# Patient Record
Sex: MALE | Race: WHITE | NOT HISPANIC OR LATINO | Employment: PART TIME | URBAN - METROPOLITAN AREA
[De-identification: names, ages, dates, MRNs, and addresses within clinical notes are randomized per-mention and may not be internally consistent; named-entity substitution may affect disease eponyms.]

---

## 2023-02-27 ENCOUNTER — OFFICE VISIT (OUTPATIENT)
Dept: URGENT CARE | Age: 68
End: 2023-02-27

## 2023-02-27 VITALS
RESPIRATION RATE: 18 BRPM | TEMPERATURE: 97.2 F | HEART RATE: 94 BPM | OXYGEN SATURATION: 99 % | DIASTOLIC BLOOD PRESSURE: 74 MMHG | SYSTOLIC BLOOD PRESSURE: 122 MMHG

## 2023-02-27 DIAGNOSIS — B02.9 HERPES ZOSTER WITHOUT COMPLICATION: Primary | ICD-10-CM

## 2023-02-27 RX ORDER — PREDNISONE 20 MG/1
40 TABLET ORAL DAILY
Qty: 8 TABLET | Refills: 0 | Status: SHIPPED | OUTPATIENT
Start: 2023-02-27 | End: 2023-03-03

## 2023-02-27 NOTE — PROGRESS NOTES
[id number] [facility]        NAME: Santos Hercules is a 74 y o  male  : 1955    MRN: [id number]  DATE: 2023  TIME: 11:12 AM    Assessment and Plan   Herpes zoster without complication [N48 2]  1  Herpes zoster without complication  predniSONE 20 mg tablet        Patient presents for eval of rash to left side chest into back  Started fri-sat  Has vesicles  Previously had shingles  States its mild  Assessment notes shinges rash to left side back and chest  Follows dermatome  No draining  Discussed treatment with prednisone  Pt history indicates contraindication for antivirals, No recent labs  At 3 day window- symptoms mild  Patient Instructions       Follow up with PCP as scheduled    Chief Complaint     Chief Complaint   Patient presents with   • Rash     Patient relates rash on left side of chest and back  Noticed Friday night  Painful         History of Present Illness       Patient presents for eval of rash to left side chest into back  Started fri-sat  Has vesicles  Previously had shingles  States its mild  Assessment notes shinges rash to left side back and chest  Follows dermatome  No draining  Discussed treatment with prednisone  Pt history indicates contraindication for antivirals, No recent labs  At 3 day window- symptoms mild  Review of Systems   Review of Systems   Constitutional: Negative for chills and fever  HENT: Negative for congestion, ear pain, postnasal drip, sinus pain and sore throat  Eyes: Negative for pain and itching  Respiratory: Negative for cough, shortness of breath and wheezing  Cardiovascular: Negative for chest pain and palpitations  Gastrointestinal: Negative for abdominal pain, constipation, diarrhea, nausea and vomiting  Genitourinary: Negative for difficulty urinating and hematuria  Musculoskeletal: Negative for arthralgias and myalgias  Skin: Positive for rash     Neurological: Negative for dizziness, light-headedness and headaches  Psychiatric/Behavioral: Negative for agitation and sleep disturbance  The patient is not nervous/anxious  Current Medications       Current Outpatient Medications:   •  predniSONE 20 mg tablet, Take 2 tablets (40 mg total) by mouth daily for 4 days, Disp: 8 tablet, Rfl: 0    Current Allergies     Allergies as of 02/27/2023   • (No Known Allergies)            The following portions of the patient's history were reviewed and updated as appropriate: allergies, current medications, past family history, past medical history, past social history, past surgical history and problem list      No past medical history on file  No past surgical history on file  No family history on file  Medications have been verified  Objective   /74   Pulse 94   Temp (!) 97 2 °F (36 2 °C)   Resp 18   SpO2 99%   No LMP for male patient  Physical Exam     Physical Exam  Vitals reviewed  Constitutional:       General: He is not in acute distress  Appearance: Normal appearance  He is not ill-appearing  HENT:      Head: Normocephalic and atraumatic  Eyes:      Extraocular Movements: Extraocular movements intact  Conjunctiva/sclera: Conjunctivae normal    Pulmonary:      Effort: Pulmonary effort is normal    Skin:     General: Skin is warm  Findings: Rash present  Rash is vesicular  Neurological:      General: No focal deficit present  Mental Status: He is alert     Psychiatric:         Mood and Affect: Mood normal          Behavior: Behavior normal          Judgment: Judgment normal

## 2023-04-03 ENCOUNTER — OFFICE VISIT (OUTPATIENT)
Dept: FAMILY MEDICINE CLINIC | Facility: CLINIC | Age: 68
End: 2023-04-03

## 2023-04-03 VITALS
DIASTOLIC BLOOD PRESSURE: 80 MMHG | SYSTOLIC BLOOD PRESSURE: 118 MMHG | OXYGEN SATURATION: 99 % | TEMPERATURE: 97.1 F | HEIGHT: 70 IN | BODY MASS INDEX: 27.2 KG/M2 | HEART RATE: 77 BPM | WEIGHT: 190 LBS | RESPIRATION RATE: 16 BRPM

## 2023-04-03 DIAGNOSIS — E78.49 OTHER HYPERLIPIDEMIA: ICD-10-CM

## 2023-04-03 DIAGNOSIS — B02.9 HERPES ZOSTER WITHOUT COMPLICATION: ICD-10-CM

## 2023-04-03 DIAGNOSIS — B02.29 POSTHERPETIC NEURALGIA: ICD-10-CM

## 2023-04-03 DIAGNOSIS — N52.2 DRUG-INDUCED ERECTILE DYSFUNCTION: ICD-10-CM

## 2023-04-03 DIAGNOSIS — Z00.00 WELCOME TO MEDICARE PREVENTIVE VISIT: ICD-10-CM

## 2023-04-03 DIAGNOSIS — I10 PRIMARY HYPERTENSION: ICD-10-CM

## 2023-04-03 DIAGNOSIS — Z12.11 ENCOUNTER FOR SCREENING FOR MALIGNANT NEOPLASM OF COLON: ICD-10-CM

## 2023-04-03 DIAGNOSIS — I48.91 NEW ONSET ATRIAL FIBRILLATION (HCC): Primary | ICD-10-CM

## 2023-04-03 DIAGNOSIS — Z12.5 PROSTATE CANCER SCREENING: ICD-10-CM

## 2023-04-03 DIAGNOSIS — Z23 ENCOUNTER FOR IMMUNIZATION: ICD-10-CM

## 2023-04-03 RX ORDER — SILDENAFIL 100 MG/1
100 TABLET, FILM COATED ORAL DAILY PRN
Qty: 30 TABLET | Refills: 0 | Status: SHIPPED | OUTPATIENT
Start: 2023-04-03

## 2023-04-03 RX ORDER — LIDOCAINE 50 MG/G
1 PATCH TOPICAL DAILY
Qty: 30 PATCH | Refills: 2 | Status: SHIPPED | OUTPATIENT
Start: 2023-04-03

## 2023-04-03 RX ORDER — GABAPENTIN 100 MG/1
100 CAPSULE ORAL 3 TIMES DAILY
Qty: 90 CAPSULE | Refills: 0 | Status: SHIPPED | OUTPATIENT
Start: 2023-04-03

## 2023-04-03 NOTE — PROGRESS NOTES
Assessment and Plan:     Problem List Items Addressed This Visit        Cardiovascular and Mediastinum    New onset atrial fibrillation ([address] ) - Primary     Currently asymptomatic and stable  Continue taking metoprolol tartrate 25 mg daily  Begin taking baby Aspirin daily since Eliquis is expensive  Continue to monitor and follow up with cardiology if needed  Relevant Medications    metoprolol tartrate (LOPRESSOR) 25 mg tablet    sildenafil (VIAGRA) 100 mg tablet    Hypertension     Well-controlled on metoprolol 25 mg twice a day  We will continue to monitor  Relevant Medications    metoprolol tartrate (LOPRESSOR) 25 mg tablet       Nervous and Auditory    Postherpetic neuralgia     He was given prescriptions for Lidoderm and gabapentin  Discussed about possible side effect  Come back in 1 month for follow-up  Relevant Medications    lidocaine (Lidoderm) 5 %    gabapentin (Neurontin) 100 mg capsule       Other    Shingles     Has been symptomatic for 5 weeks  Prescribed 5 day dose of prednisone at urgent care in February - no relief  Patient was never prescribed antiviral  Symptoms remain  Patient taking tylenol PRN pain  Prescribe gabapentin 100 mg tablets and lidocaine 5% cream to reduce pain since antivirals will not work after this much time  Continue to monitor  Welcome to Medicare preventive visit     Discussed diet, exercise, immunizations and safety measures  Repeat baseline labs scheduled for next visit  Referral for colonoscopy sent  Received Prevnar shot in office today  Drug-induced erectile dysfunction     He was given a prescription for Viagra  Discussed with possible side effects  Relevant Medications    sildenafil (VIAGRA) 100 mg tablet    Other hyperlipidemia     Not well controlled his LDL was slightly elevated  Discussed about low-fat diet and regular exercise  I will repeat his fasting lipid profile  Continue to monitor           Relevant Orders    CBC and differential    Comprehensive metabolic panel    Lipid Panel with Direct LDL reflex    TSH, 3rd generation with Free T4 reflex   Other Visit Diagnoses     Prostate cancer screening        Relevant Orders    PSA, Total Screen    Encounter for immunization        Relevant Orders    Pneumococcal Conjugate Vaccine 20-valent (Pcv20) (Completed)    Encounter for screening for malignant neoplasm of colon        Relevant Orders    Ambulatory Referral to General Surgery    BMI 27 0-27 9,adult               Preventive health issues were discussed with patient, and age appropriate screening tests were ordered as noted in patient's After Visit Summary  Personalized health advice and appropriate referrals for health education or preventive services given if needed, as noted in patient's After Visit Summary  History of Present Illness:     Patient presents for a Medicare Wellness Visit    HPI   Patient Care Team:  Stefani Ty MD as PCP - General (Family Medicine)     Review of Systems:     Review of Systems   Constitutional: Negative for chills and fever  HENT: Negative for trouble swallowing  Eyes: Negative for visual disturbance  Respiratory: Negative for cough and shortness of breath  Cardiovascular: Negative for chest pain and palpitations  Gastrointestinal: Negative for abdominal pain, blood in stool and vomiting  Endocrine: Negative for cold intolerance and heat intolerance  Genitourinary: Negative for difficulty urinating and dysuria  Musculoskeletal: Negative for gait problem  Skin: Positive for rash  Neurological: Negative for dizziness, syncope and headaches  Hematological: Negative for adenopathy  Psychiatric/Behavioral: Negative for behavioral problems          Problem List:     Patient Active Problem List   Diagnosis   • New onset atrial fibrillation ([address] )   • Shingles   • Welcome to Medicare preventive visit   • Hypertension   • Postherpetic neuralgia   • Drug-induced erectile dysfunction   • Other hyperlipidemia      Past Medical and Surgical History:     Past Medical History:   Diagnosis Date   • Hypertension      History reviewed  No pertinent surgical history  Family History:     Family History   Problem Relation Age of Onset   • Colon cancer Mother    • Diabetes Father       Social History:     Social History     Socioeconomic History   • Marital status: Single     Spouse name: None   • Number of children: None   • Years of education: None   • Highest education level: None   Occupational History   • None   Tobacco Use   • Smoking status: Never   • Smokeless tobacco: Never   Vaping Use   • Vaping Use: Never used   Substance and Sexual Activity   • Alcohol use: Never   • Drug use: Never   • Sexual activity: None   Other Topics Concern   • None   Social History Narrative   • None     Social Determinants of Health     Financial Resource Strain: Not on file   Food Insecurity: Not on file   Transportation Needs: Not on file   Physical Activity: Not on file   Stress: Not on file   Social Connections: Not on file   Intimate Partner Violence: Not on file   Housing Stability: Not on file      Medications and Allergies:     Current Outpatient Medications   Medication Sig Dispense Refill   • gabapentin (Neurontin) 100 mg capsule Take 1 capsule (100 mg total) by mouth 3 (three) times a day 90 capsule 0   • lidocaine (Lidoderm) 5 % Apply 1 patch topically over 12 hours daily Remove & Discard patch within 12 hours or as directed by MD 30 patch 2   • sildenafil (VIAGRA) 100 mg tablet Take 1 tablet (100 mg total) by mouth daily as needed for erectile dysfunction 30 tablet 0   • metoprolol tartrate (LOPRESSOR) 25 mg tablet Take 25 mg by mouth 2 (two) times a day       No current facility-administered medications for this visit       No Known Allergies   Immunizations:     Immunization History   Administered Date(s) Administered   • INFLUENZA 11/06/2021, 10/11/2022   • Pneumococcal Conjugate Vaccine 20-valent (Pcv20), Polysace 04/03/2023      Health Maintenance:         Topic Date Due   • Hepatitis C Screening  Never done   • HIV Screening  Never done   • Colorectal Cancer Screening  Never done         Topic Date Due   • COVID-19 Vaccine (1) Never done      Medicare Screening Tests and Risk Assessments:     Tracy Evangelista is here for his Subsequent Wellness visit  Health Risk Assessment:   Patient rates overall health as good  Patient feels that their physical health rating is same  Patient is satisfied with their life  Eyesight was rated as same  Hearing was rated as same  Patient feels that their emotional and mental health rating is same  Patients states they are never, rarely angry  Patient states they are never, rarely unusually tired/fatigued  Pain experienced in the last 7 days has been some  Patient's pain rating has been 4/10  Patient states that he has experienced no weight loss or gain in last 6 months  Depression Screening:   PHQ-2 Score: 0      Fall Risk Screening: In the past year, patient has experienced: no history of falling in past year      Home Safety:  Patient does not have trouble with stairs inside or outside of their home  Patient has working smoke alarms and has working carbon monoxide detector  Home safety hazards include: none  Nutrition:   Current diet is Regular  Medications:   Patient is not currently taking any over-the-counter supplements  Patient is able to manage medications  Activities of Daily Living (ADLs)/Instrumental Activities of Daily Living (IADLs):   Walk and transfer into and out of bed and chair?: Yes  Dress and groom yourself?: Yes    Bathe or shower yourself?: Yes    Feed yourself?  Yes  Do your laundry/housekeeping?: Yes  Manage your money, pay your bills and track your expenses?: Yes  Make your own meals?: Yes    Do your own shopping?: Yes    Previous Hospitalizations:   Any hospitalizations or ED visits within the last "12 months?: No      Advance Care Planning:   Living will: No    Durable POA for healthcare: No    Advanced directive: No      Cognitive Screening:   Provider or family/friend/caregiver concerned regarding cognition?: No    PREVENTIVE SCREENINGS      Cardiovascular Screening:    General: Screening Current      Diabetes Screening:     General: Risks and Benefits Discussed    Due for: Blood Glucose      Colorectal Cancer Screening:     General: Risks and Benefits Discussed    Due for: Colonoscopy - Low Risk      Prostate Cancer Screening:    General: Risks and Benefits Discussed    Due for: PSA      Osteoporosis Screening:    General: Risks and Benefits Discussed      Abdominal Aortic Aneurysm (AAA) Screening:    Risk factors include: age between 73-67 yo        General: Risks and Benefits Discussed      Lung Cancer Screening:     General: Screening Not Indicated      Hepatitis C Screening:    General: Risks and Benefits Discussed    Screening, Brief Intervention, and Referral to Treatment (SBIRT)    Screening  Typical number of drinks in a day: 0  Typical number of drinks in a week: 0  Interpretation: Low risk drinking behavior  Single Item Drug Screening:  How often have you used an illegal drug (including marijuana) or a prescription medication for non-medical reasons in the past year? never    Single Item Drug Screen Score: 0  Interpretation: Negative screen for possible drug use disorder    Brief Intervention  Alcohol & drug use screenings were reviewed  No concerns regarding substance use disorder identified  Other Counseling Topics:   Calcium and vitamin D intake and regular weightbearing exercise  No results found       Physical Exam:     /80 (BP Location: Left arm, Patient Position: Sitting, Cuff Size: Adult)   Pulse 77   Temp (!) 97 1 °F (36 2 °C) (Tympanic)   Resp 16   Ht 5' 10\" (1 778 m)   Wt 86 2 kg (190 lb)   SpO2 99%   BMI 27 26 kg/m²     Physical Exam  Vitals and nursing note " reviewed  Constitutional:       General: He is not in acute distress  Appearance: He is well-developed  HENT:      Head: Normocephalic and atraumatic  Eyes:      Conjunctiva/sclera: Conjunctivae normal    Cardiovascular:      Rate and Rhythm: Normal rate and regular rhythm  Heart sounds: No murmur heard  Pulmonary:      Effort: Pulmonary effort is normal  No respiratory distress  Breath sounds: Normal breath sounds  Abdominal:      Palpations: Abdomen is soft  Tenderness: There is no abdominal tenderness  Musculoskeletal:         General: No swelling  Cervical back: Neck supple  Skin:     General: Skin is warm and dry  Capillary Refill: Capillary refill takes less than 2 seconds  Findings: Rash present  Neurological:      Mental Status: He is alert     Psychiatric:         Mood and Affect: Mood normal           Scott Mccarty MD

## 2023-04-03 NOTE — ASSESSMENT & PLAN NOTE
Has been symptomatic for 5 weeks  Prescribed 5 day dose of prednisone at urgent care in February - no relief  Patient was never prescribed antiviral  Symptoms remain  Patient taking tylenol PRN pain  Prescribe gabapentin 100 mg tablets and lidocaine 5% cream to reduce pain since antivirals will not work after this much time  Continue to monitor

## 2023-04-03 NOTE — ASSESSMENT & PLAN NOTE
He was given prescriptions for Lidoderm and gabapentin  Discussed about possible side effect  Come back in 1 month for follow-up

## 2023-04-03 NOTE — ASSESSMENT & PLAN NOTE
Currently asymptomatic and stable  Continue taking metoprolol tartrate 25 mg daily  Begin taking baby Aspirin daily since Eliquis is expensive  Continue to monitor and follow up with cardiology if needed

## 2023-04-03 NOTE — ASSESSMENT & PLAN NOTE
Not well controlled his LDL was slightly elevated  Discussed about low-fat diet and regular exercise  I will repeat his fasting lipid profile  Continue to monitor

## 2023-04-03 NOTE — ASSESSMENT & PLAN NOTE
Discussed diet, exercise, immunizations and safety measures  Repeat baseline labs scheduled for next visit  Referral for colonoscopy sent  Received Prevnar shot in office today

## 2023-04-03 NOTE — PROGRESS NOTES
Name: Han Rosas      : 1955      MRN: [id number]  Encounter Provider: Birgit Willis MD  Encounter Date: 4/3/2023   Encounter department: [address] [place] [address]  New onset atrial fibrillation [facility])  Assessment & Plan:  Currently asymptomatic and stable  Continue taking metoprolol tartrate 25 mg daily  Begin taking baby Aspirin daily since Eliquis is expensive  Continue to monitor and follow up with cardiology if needed  2  Primary hypertension  Assessment & Plan:  Well-controlled on metoprolol 25 mg twice a day  We will continue to monitor  3  Postherpetic neuralgia  Assessment & Plan:  He was given prescriptions for Lidoderm and gabapentin  Discussed about possible side effect  Come back in 1 month for follow-up  Orders:  -     lidocaine (Lidoderm) 5 %; Apply 1 patch topically over 12 hours daily Remove & Discard patch within 12 hours or as directed by MD  -     gabapentin (Neurontin) 100 mg capsule; Take 1 capsule (100 mg total) by mouth 3 (three) times a day    4  Drug-induced erectile dysfunction  Assessment & Plan:  He was given a prescription for Viagra  Discussed with possible side effects  Orders:  -     sildenafil (VIAGRA) 100 mg tablet; Take 1 tablet (100 mg total) by mouth daily as needed for erectile dysfunction    5  Other hyperlipidemia  Assessment & Plan:  Not well controlled his LDL was slightly elevated  Discussed about low-fat diet and regular exercise  I will repeat his fasting lipid profile  Continue to monitor  Orders:  -     CBC and differential; Future  -     Comprehensive metabolic panel; Future  -     Lipid Panel with Direct LDL reflex; Future  -     TSH, 3rd generation with Free T4 reflex; Future    6  Prostate cancer screening  -     PSA, Total Screen; Future    7  Herpes zoster without complication  Assessment & Plan:  Has been symptomatic for 5 weeks   Prescribed 5 day dose of prednisone at urgent care in February - no relief  Patient was never prescribed antiviral  Symptoms remain  Patient taking tylenol PRN pain  Prescribe gabapentin 100 mg tablets and lidocaine 5% cream to reduce pain since antivirals will not work after this much time  Continue to monitor  8  Encounter for immunization  -     Pneumococcal Conjugate Vaccine 20-valent (Pcv20)    9  Welcome to Medicare preventive visit  Assessment & Plan:  Discussed diet, exercise, immunizations and safety measures  Repeat baseline labs scheduled for next visit  Referral for colonoscopy sent  Received Prevnar shot in office today  10  Encounter for screening for malignant neoplasm of colon  -     Ambulatory Referral to General Surgery; Future    11  BMI 27 0-27 9,adult      BMI Counseling: Body mass index is 27 26 kg/m²  The BMI is above normal  Nutrition recommendations include decreasing portion sizes, encouraging healthy choices of fruits and vegetables and decreasing fast food intake  Rationale for BMI follow-up plan is due to patient being overweight or obese  Depression Screening and Follow-up Plan: Patient was screened for depression during today's encounter  They screened negative with a PHQ-2 score of 0  Falls Plan of Care: balance, strength, and gait training instructions were provided  Ben Gordillo is a 74year old male with PMX of HTN and acute onset atrial fibrillation, presenting to establish care  On 10/18/23 he was diagnosed with acute atrial fibrillation and placed on metoprolol tatrate as well as Eliquis  He was seen by cardiology and they performed an EKG and ECHO which showed active atrial fibrillation  He has not been taking the Eliquis but has remained on the metoprolol  He no longer follows with cardiology  He denies recent chest pain, palpitations, SOB, edema or dizziness  His BP is controlled at 118/80   He has also been suffering from Shingles that began 5 weeks ago originating on the left abdomen and going to the left back  He was seen at urgent care and given 5 days of prednisone, but no antivirals  He states he has been taking tylenol since the prednisone ran out due to increased pain  He has no other concerns at this time  Review of Systems   Constitutional: Negative for activity change, chills, fatigue and fever  HENT: Negative for congestion, sore throat and trouble swallowing  Eyes: Negative  Respiratory: Negative for apnea, cough and shortness of breath  Cardiovascular: Negative for chest pain, palpitations and leg swelling  Gastrointestinal: Negative for abdominal distention, abdominal pain, constipation, diarrhea, nausea and vomiting  Endocrine: Negative  Genitourinary: Negative  Musculoskeletal: Negative  Skin: Positive for rash (left abdomen/back (shingles))  Allergic/Immunologic: Negative  Neurological: Negative for dizziness, weakness and headaches  Hematological: Negative  Psychiatric/Behavioral: Negative for agitation, behavioral problems and confusion  Past Medical History:   Diagnosis Date   • Hypertension      History reviewed  No pertinent surgical history    Family History   Problem Relation Age of Onset   • Colon cancer Mother    • Diabetes Father      Social History     Socioeconomic History   • Marital status: Single     Spouse name: None   • Number of children: None   • Years of education: None   • Highest education level: None   Occupational History   • None   Tobacco Use   • Smoking status: Never   • Smokeless tobacco: Never   Vaping Use   • Vaping Use: Never used   Substance and Sexual Activity   • Alcohol use: Never   • Drug use: Never   • Sexual activity: None   Other Topics Concern   • None   Social History Narrative   • None     Social Determinants of Health     Financial Resource Strain: Not on file   Food Insecurity: Not on file   Transportation Needs: Not on file   Physical Activity: Not on file   Stress: Not on file   Social Connections: Not "on file   Intimate Partner Violence: Not on file   Housing Stability: Not on file     Current Outpatient Medications on File Prior to Visit   Medication Sig   • metoprolol tartrate (LOPRESSOR) 25 mg tablet Take 25 mg by mouth 2 (two) times a day     No Known Allergies  Immunization History   Administered Date(s) Administered   • INFLUENZA 11/06/2021, 10/11/2022   • Pneumococcal Conjugate Vaccine 20-valent (Pcv20), Polysace 04/03/2023       Objective     /80 (BP Location: Left arm, Patient Position: Sitting, Cuff Size: Adult)   Pulse 77   Temp (!) 97 1 °F (36 2 °C) (Tympanic)   Resp 16   Ht 5' 10\" (1 778 m)   Wt 86 2 kg (190 lb)   SpO2 99%   BMI 27 26 kg/m²     Physical Exam  Vitals and nursing note reviewed  Constitutional:       General: He is not in acute distress  Appearance: Normal appearance  HENT:      Head: Normocephalic and atraumatic  Right Ear: Tympanic membrane normal       Left Ear: Tympanic membrane normal       Nose: Nose normal       Mouth/Throat:      Mouth: Mucous membranes are moist       Pharynx: Oropharynx is clear  No oropharyngeal exudate  Eyes:      Extraocular Movements: Extraocular movements intact  Conjunctiva/sclera: Conjunctivae normal       Pupils: Pupils are equal, round, and reactive to light  Cardiovascular:      Rate and Rhythm: Normal rate and regular rhythm  Pulses: Normal pulses  Heart sounds: Normal heart sounds  No murmur heard  Pulmonary:      Effort: Pulmonary effort is normal  No respiratory distress  Breath sounds: Normal breath sounds  Abdominal:      General: Abdomen is flat  Bowel sounds are normal  There is no distension  Palpations: Abdomen is soft  Tenderness: There is no abdominal tenderness  Musculoskeletal:         General: Normal range of motion  Cervical back: Normal range of motion and neck supple  Back:       Right lower leg: No edema  Left lower leg: No edema        Comments: " Shingles rash located on left abdomen/back  Skin:     General: Skin is warm and dry  Capillary Refill: Capillary refill takes less than 2 seconds  Findings: Rash (left abdomen/back (shingles)) present  Neurological:      General: No focal deficit present  Mental Status: He is alert and oriented to person, place, and time  Psychiatric:         Mood and Affect: Mood normal          Behavior: Behavior normal        Suzanne Srinivasan MD BMI Counseling: Body mass index is 27 26 kg/m²  The BMI is above normal  Nutrition recommendations include reducing portion sizes, decreasing overall calorie intake and 3-5 servings of fruits/vegetables daily  Exercise recommendations include moderate aerobic physical activity for 150 minutes/week

## 2023-04-05 ENCOUNTER — TELEPHONE (OUTPATIENT)
Dept: FAMILY MEDICINE CLINIC | Facility: CLINIC | Age: 68
End: 2023-04-05

## 2023-04-07 NOTE — TELEPHONE ENCOUNTER
Per cover my meds, Lidocaine patch approved  L/m for pt and pharmacy     Case [id number] valid 3/6/23-4/4/24

## 2023-04-24 ENCOUNTER — OFFICE VISIT (OUTPATIENT)
Dept: SURGERY | Facility: CLINIC | Age: 68
End: 2023-04-24

## 2023-04-24 ENCOUNTER — RA CDI HCC (OUTPATIENT)
Dept: OTHER | Facility: HOSPITAL | Age: 68
End: 2023-04-24

## 2023-04-24 VITALS
HEIGHT: 70 IN | BODY MASS INDEX: 27.2 KG/M2 | HEART RATE: 82 BPM | SYSTOLIC BLOOD PRESSURE: 128 MMHG | WEIGHT: 190 LBS | TEMPERATURE: 97.6 F | DIASTOLIC BLOOD PRESSURE: 70 MMHG

## 2023-04-24 DIAGNOSIS — Z12.11 ENCOUNTER FOR SCREENING COLONOSCOPY: Primary | ICD-10-CM

## 2023-04-24 DIAGNOSIS — Z80.0 FAMILY HISTORY OF COLON CANCER IN MOTHER: ICD-10-CM

## 2023-04-24 NOTE — PROGRESS NOTES
[address]  coding opportunities       Chart reviewed, no opportunity found: CHART REVIEWED, NO OPPORTUNITY FOUND        Patients Insurance     Medicare Insurance: Medicare

## 2023-04-24 NOTE — PROGRESS NOTES
Assessment/Plan:   Zenia Villeda is a 74 y  o male who is here for a: Screening Colonoscopy  Plan: Colonoscopy for: Screening for Colon Cancer      Previous Colonoscopy and  Location of polyps if any:   10 years ago      Preoperative Clearance: None      ____________________________________________________    HPI:  Zenia Villeda is a 74 y  o male who was referred for evaluation of    Screening  Currently:     Symptoms include:  no abdominal pain, change in bowel habits, or black or bloody stools  Family history of colon cancer: mother had colon cancer at [de-identified]       Anticoagulation: ASA for afib    Recent A1C: none      LABS:      Lab Results   Component Value Date    WBC 4 57 04/11/2023    HGB 12 6 04/11/2023    HCT 39 1 04/11/2023    MCV 95 04/11/2023     04/11/2023     Lab Results   Component Value Date    K 4 5 04/11/2023     04/11/2023    CO2 25 04/11/2023    BUN 16 04/11/2023    CREATININE 1 17 04/11/2023    GLUF 98 04/11/2023    CALCIUM 8 9 04/11/2023    AST 15 04/11/2023    ALT 18 04/11/2023    ALKPHOS 45 (L) 04/11/2023    EGFR 65 04/11/2023     No results found for: HGBA1C  No results found for: INR, PROTIME      No orders to display         Review of Systems   Constitutional: Negative for chills, diaphoresis, fever and unexpected weight change  Respiratory: Negative for chest tightness and shortness of breath  Cardiovascular: Negative for chest pain, palpitations and leg swelling  Gastrointestinal: Negative for abdominal pain, anal bleeding, blood in stool, constipation, diarrhea, nausea, rectal pain and vomiting  Genitourinary: Negative for difficulty urinating and frequency  Skin: Negative for color change, rash and wound  Neurological: Negative for weakness and numbness  Hematological: Does not bruise/bleed easily  Psychiatric/Behavioral: Negative for confusion  The patient is not nervous/anxious  Imaging: No new pertinent imaging studies           Patient Active Problem List   Diagnosis   • New onset atrial fibrillation (HCC)   • Shingles   • Welcome to Medicare preventive visit   • Hypertension   • Postherpetic neuralgia   • Drug-induced erectile dysfunction   • Other hyperlipidemia         Allergies:  Patient has no known allergies  Current Outpatient Medications:   •  gabapentin (Neurontin) 100 mg capsule, Take 1 capsule (100 mg total) by mouth 3 (three) times a day, Disp: 90 capsule, Rfl: 0  •  lidocaine (Lidoderm) 5 %, Apply 1 patch topically over 12 hours daily Remove & Discard patch within 12 hours or as directed by MD, Disp: 30 patch, Rfl: 2  •  metoprolol tartrate (LOPRESSOR) 25 mg tablet, Take 25 mg by mouth 2 (two) times a day, Disp: , Rfl:     Past Medical History:   Diagnosis Date   • Hypertension        History reviewed  No pertinent surgical history  Family History   Problem Relation Age of Onset   • Colon cancer Mother    • Diabetes Father        Social History     Socioeconomic History   • Marital status: Single     Spouse name: None   • Number of children: None   • Years of education: None   • Highest education level: None   Occupational History   • None   Tobacco Use   • Smoking status: Never   • Smokeless tobacco: Never   Vaping Use   • Vaping Use: Never used   Substance and Sexual Activity   • Alcohol use: Never   • Drug use: Never   • Sexual activity: None   Other Topics Concern   • None   Social History Narrative   • None     Social Determinants of Health     Financial Resource Strain: Not on file   Food Insecurity: Not on file   Transportation Needs: Not on file   Physical Activity: Not on file   Stress: Not on file   Social Connections: Not on file   Intimate Partner Violence: Not on file   Housing Stability: Not on file       Exam:   Vitals:    04/24/23 0920   BP: 128/70   Pulse: 82   Temp: 97 6 °F (36 4 °C)           ______________________________________________________    Physical Exam  Vitals and nursing note reviewed   Exam conducted with a chaperone present  Constitutional:       General: He is not in acute distress  Appearance: Normal appearance  He is normal weight  He is not ill-appearing, toxic-appearing or diaphoretic  HENT:      Head: Normocephalic and atraumatic  Cardiovascular:      Rate and Rhythm: Normal rate and regular rhythm  Pulses: Normal pulses  Heart sounds: Normal heart sounds  No murmur heard  No gallop  Pulmonary:      Effort: Pulmonary effort is normal       Breath sounds: Normal breath sounds  Abdominal:      General: Abdomen is flat  There is no distension  Palpations: Abdomen is soft  There is no mass  Tenderness: There is no abdominal tenderness  There is no guarding or rebound  Hernia: No hernia is present  Comments: Patient does have healing scarred shingles over his left lower chest along to his back  Musculoskeletal:         General: No swelling or tenderness  Lymphadenopathy:      Cervical: No cervical adenopathy  Skin:     General: Skin is warm and dry  Neurological:      General: No focal deficit present  Mental Status: He is alert and oriented to person, place, and time  Mental status is at baseline  Psychiatric:         Mood and Affect: Mood normal          Behavior: Behavior normal          Thought Content: Thought content normal          Judgment: Judgment normal            Informed consent for procedure was personally discussed, reviewed, and signed by Dr Duc Bell  Discussion by Dr Duc Bell was carried out regarding risks, benefits, and alternatives with the patient  Risks include but are not limited to:  bleeding, infection, and delayed wound healing or an open wound, pulmonary embolus, leaks from bowel or bile ducts or other viscus, transfusions, death  Discussed in further detail the more common complications and their rates of occurrence   was used if necessary    Patient expressed understanding of the issues discussed and wished/consented to proceed  All questions were answered by Dr Yvette Cueva  Discussion performed between patient and the provider signing below       Signature:   Chaparro Engel  Date: 4/24/2023 Time: 9:23 AM

## 2023-05-01 ENCOUNTER — OFFICE VISIT (OUTPATIENT)
Dept: FAMILY MEDICINE CLINIC | Facility: CLINIC | Age: 68
End: 2023-05-01

## 2023-05-01 VITALS
BODY MASS INDEX: 27.77 KG/M2 | SYSTOLIC BLOOD PRESSURE: 116 MMHG | OXYGEN SATURATION: 99 % | HEIGHT: 70 IN | TEMPERATURE: 97.8 F | HEART RATE: 110 BPM | WEIGHT: 194 LBS | DIASTOLIC BLOOD PRESSURE: 80 MMHG

## 2023-05-01 DIAGNOSIS — F41.9 ANXIETY: ICD-10-CM

## 2023-05-01 DIAGNOSIS — B02.29 POSTHERPETIC NEURALGIA: Primary | ICD-10-CM

## 2023-05-01 DIAGNOSIS — I48.20 CHRONIC A-FIB (HCC): ICD-10-CM

## 2023-05-01 DIAGNOSIS — E78.49 OTHER HYPERLIPIDEMIA: ICD-10-CM

## 2023-05-01 DIAGNOSIS — I10 PRIMARY HYPERTENSION: ICD-10-CM

## 2023-05-01 RX ORDER — GABAPENTIN 100 MG/1
100 CAPSULE ORAL 3 TIMES DAILY
Qty: 90 CAPSULE | Refills: 5 | Status: SHIPPED | OUTPATIENT
Start: 2023-05-01

## 2023-05-01 NOTE — ASSESSMENT & PLAN NOTE
Well-controlled  Continue metoprolol  Continue to monitor  cont levothyroxine-increase to 125mcg on 10/31 on empty stomach, unclear if was taking appropriately

## 2023-05-01 NOTE — PROGRESS NOTES
Name: Uli Hogue      : 1955      MRN: [id number]  Encounter Provider: Andrey Kumar MD  Encounter Date: 2023   Encounter department: [address] [place] [address]  Postherpetic neuralgia  Assessment & Plan:  Improving but not resolved  Continue gabapentin  Was given refill  Orders:  -     gabapentin (Neurontin) 100 mg capsule; Take 1 capsule (100 mg total) by mouth 3 (three) times a day    2  Primary hypertension  Assessment & Plan:  Well-controlled  Continue metoprolol  Continue to monitor  Orders:  -     CBC and differential; Future  -     Comprehensive metabolic panel; Future  -     Lipid Panel with Direct LDL reflex; Future  -     TSH, 3rd generation with Free T4 reflex; Future    3  Chronic a-fib ([address] )  Assessment & Plan:  Stable  Asymptomatic  Rate controlled  Continue to monitor  Continue on metoprolol  4  Other hyperlipidemia  Assessment & Plan:  Well-controlled  Continue to follow low-fat diet and regular exercise  5  Anxiety  Assessment & Plan: It was discussed with patient to take Neurontin to help with his anxiety  Was discussed with patient that metoprolol and propranolol or beta-blocker and he is not to take both of them at the same time  Subjective     Is here today for follow-up multiple medical problems  He has been taking medication  Denies any side effect  His blood pressures been well controlled  He has been taking his metoprolol  His postherpetic neuralgia is better controlled on Neurontin  He has history of anxiety and he is to take Inderal to help with his anxiety symptoms  Review of Systems   Constitutional: Negative for chills and fever  HENT: Negative for trouble swallowing  Eyes: Negative for visual disturbance  Respiratory: Negative for cough and shortness of breath  Cardiovascular: Negative for chest pain and palpitations     Gastrointestinal: Negative for abdominal pain, blood in stool and vomiting  Endocrine: Negative for cold intolerance and heat intolerance  Genitourinary: Negative for difficulty urinating and dysuria  Musculoskeletal: Negative for gait problem  Skin: Negative for rash  Neurological: Negative for dizziness, syncope and headaches  Hematological: Negative for adenopathy  Psychiatric/Behavioral: Negative for behavioral problems  The patient is nervous/anxious  Past Medical History:   Diagnosis Date    Hypertension      History reviewed  No pertinent surgical history    Family History   Problem Relation Age of Onset    Colon cancer Mother     Diabetes Father      Social History     Socioeconomic History    Marital status: Single     Spouse name: None    Number of children: None    Years of education: None    Highest education level: None   Occupational History    None   Tobacco Use    Smoking status: Never    Smokeless tobacco: Never   Vaping Use    Vaping Use: Never used   Substance and Sexual Activity    Alcohol use: Never    Drug use: Never    Sexual activity: None   Other Topics Concern    None   Social History Narrative    None     Social Determinants of Health     Financial Resource Strain: Not on file   Food Insecurity: Not on file   Transportation Needs: Not on file   Physical Activity: Not on file   Stress: Not on file   Social Connections: Not on file   Intimate Partner Violence: Not on file   Housing Stability: Not on file     Current Outpatient Medications on File Prior to Visit   Medication Sig    lidocaine (Lidoderm) 5 % Apply 1 patch topically over 12 hours daily Remove & Discard patch within 12 hours or as directed by MD    metoprolol tartrate (LOPRESSOR) 25 mg tablet Take 25 mg by mouth 2 (two) times a day    [DISCONTINUED] gabapentin (Neurontin) 100 mg capsule Take 1 capsule (100 mg total) by mouth 3 (three) times a day     No Known Allergies  Immunization History   Administered Date(s) Administered    INFLUENZA "11/06/2021, 10/11/2022    Pneumococcal Conjugate Vaccine 20-valent (Pcv20), Polysace 04/03/2023       Objective     /80 (BP Location: Left arm, Patient Position: Sitting, Cuff Size: Adult)   Pulse (!) 110   Temp 97 8 °F (36 6 °C) (Tympanic)   Ht 5' 10\" (1 778 m)   Wt 88 kg (194 lb)   SpO2 99%   BMI 27 84 kg/m²     Physical Exam  Vitals and nursing note reviewed  Constitutional:       Appearance: He is well-developed  HENT:      Head: Normocephalic and atraumatic  Eyes:      Pupils: Pupils are equal, round, and reactive to light  Cardiovascular:      Rate and Rhythm: Normal rate and regular rhythm  Heart sounds: Normal heart sounds  Pulmonary:      Effort: Pulmonary effort is normal       Breath sounds: Normal breath sounds  Abdominal:      General: Bowel sounds are normal       Palpations: Abdomen is soft  Musculoskeletal:         General: Normal range of motion  Cervical back: Normal range of motion and neck supple  Lymphadenopathy:      Cervical: No cervical adenopathy  Skin:     General: Skin is warm  Findings: No rash  Neurological:      Mental Status: He is alert and oriented to person, place, and time  Cranial Nerves: No cranial nerve deficit         Samanta Mejía MD  "

## 2023-05-01 NOTE — ASSESSMENT & PLAN NOTE
It was discussed with patient to take Neurontin to help with his anxiety  Was discussed with patient that metoprolol and propranolol or beta-blocker and he is not to take both of them at the same time

## 2023-06-02 PROBLEM — Z00.00 WELCOME TO MEDICARE PREVENTIVE VISIT: Status: RESOLVED | Noted: 2023-04-03 | Resolved: 2023-06-02

## 2023-06-29 ENCOUNTER — OFFICE VISIT (OUTPATIENT)
Dept: SURGERY | Facility: CLINIC | Age: 68
End: 2023-06-29

## 2023-06-29 VITALS
TEMPERATURE: 97.3 F | DIASTOLIC BLOOD PRESSURE: 78 MMHG | BODY MASS INDEX: 27.77 KG/M2 | HEIGHT: 70 IN | SYSTOLIC BLOOD PRESSURE: 110 MMHG | WEIGHT: 194 LBS | HEART RATE: 86 BPM

## 2023-06-29 DIAGNOSIS — Z12.11 ENCOUNTER FOR SCREENING COLONOSCOPY: Primary | ICD-10-CM

## 2023-06-29 NOTE — H&P (VIEW-ONLY)
Assessment/Plan:   Daniel Mckinney is a 79 y.o.male who is here for a: Screening Colonoscopy. No changes in medical history or medications. Plan: Colonoscopy for: Screening for Colon Cancer      Previous Colonoscopy and  Location of polyps if any:   10 years ago      Preoperative Clearance: None      ____________________________________________________    HPI:  Daniel Mckinney is a 79 y.o.male who was referred for evaluation of    Screening. Currently:     Symptoms include:  no abdominal pain, change in bowel habits, or black or bloody stools. Family history of colon cancer: mother had colon cancer at 80       Anticoagulation: ASA for afib    Recent A1C: none      LABS:      Lab Results   Component Value Date    WBC 4.57 04/11/2023    HGB 12.6 04/11/2023    HCT 39.1 04/11/2023    MCV 95 04/11/2023     04/11/2023     Lab Results   Component Value Date    K 4.5 04/11/2023     04/11/2023    CO2 25 04/11/2023    BUN 16 04/11/2023    CREATININE 1.17 04/11/2023    GLUF 98 04/11/2023    CALCIUM 8.9 04/11/2023    AST 15 04/11/2023    ALT 18 04/11/2023    ALKPHOS 45 (L) 04/11/2023    EGFR 65 04/11/2023     No results found for: "HGBA1C"  No results found for: "INR", "PROTIME"      No orders to display         Review of Systems   Constitutional: Negative for chills, diaphoresis, fever and unexpected weight change. Respiratory: Negative for chest tightness and shortness of breath. Cardiovascular: Negative for chest pain, palpitations and leg swelling. Gastrointestinal: Negative for abdominal pain, anal bleeding, blood in stool, constipation, diarrhea, nausea, rectal pain and vomiting. Genitourinary: Negative for difficulty urinating and frequency. Skin: Negative for color change, rash and wound. Neurological: Negative for weakness and numbness. Hematological: Does not bruise/bleed easily. Psychiatric/Behavioral: Negative for confusion. The patient is not nervous/anxious. Imaging: No new pertinent imaging studies. Patient Active Problem List   Diagnosis   • Chronic a-fib (HCC)   • Shingles   • Hypertension   • Postherpetic neuralgia   • Drug-induced erectile dysfunction   • Other hyperlipidemia   • Anxiety         Allergies:  Patient has no known allergies. Current Outpatient Medications:   •  gabapentin (Neurontin) 100 mg capsule, Take 1 capsule (100 mg total) by mouth 3 (three) times a day, Disp: 90 capsule, Rfl: 5  •  metoprolol tartrate (LOPRESSOR) 25 mg tablet, Take 25 mg by mouth 2 (two) times a day, Disp: , Rfl:   •  lidocaine (Lidoderm) 5 %, Apply 1 patch topically over 12 hours daily Remove & Discard patch within 12 hours or as directed by MD (Patient not taking: Reported on 6/29/2023), Disp: 30 patch, Rfl: 2    Past Medical History:   Diagnosis Date   • Hypertension        No past surgical history on file.     Family History   Problem Relation Age of Onset   • Colon cancer Mother    • Diabetes Father        Social History     Socioeconomic History   • Marital status: Single     Spouse name: None   • Number of children: None   • Years of education: None   • Highest education level: None   Occupational History   • None   Tobacco Use   • Smoking status: Never   • Smokeless tobacco: Never   Vaping Use   • Vaping Use: Never used   Substance and Sexual Activity   • Alcohol use: Not Currently   • Drug use: Never   • Sexual activity: None   Other Topics Concern   • None   Social History Narrative   • None     Social Determinants of Health     Financial Resource Strain: Not on file   Food Insecurity: Not on file   Transportation Needs: Not on file   Physical Activity: Not on file   Stress: Not on file   Social Connections: Not on file   Intimate Partner Violence: Not on file   Housing Stability: Not on file       Exam:   There were no vitals filed for this visit.        ______________________________________________________    Physical Exam  Vitals and nursing note reviewed. Exam conducted with a chaperone present. Constitutional:       General: He is not in acute distress. Appearance: Normal appearance. He is normal weight. He is not ill-appearing, toxic-appearing or diaphoretic. HENT:      Head: Normocephalic and atraumatic. Cardiovascular:      Rate and Rhythm: Normal rate and regular rhythm. Pulses: Normal pulses. Heart sounds: Normal heart sounds. No murmur heard. No gallop. Pulmonary:      Effort: Pulmonary effort is normal.      Breath sounds: Normal breath sounds. Abdominal:      General: Abdomen is flat. There is no distension. Palpations: Abdomen is soft. There is no mass. Tenderness: There is no abdominal tenderness. There is no guarding or rebound. Hernia: No hernia is present. Comments: Patient does have healing scarred shingles over his left lower chest along to his back. Musculoskeletal:         General: No swelling or tenderness. Lymphadenopathy:      Cervical: No cervical adenopathy. Skin:     General: Skin is warm and dry. Neurological:      General: No focal deficit present. Mental Status: He is alert and oriented to person, place, and time. Mental status is at baseline. Psychiatric:         Mood and Affect: Mood normal.         Behavior: Behavior normal.         Thought Content: Thought content normal.         Judgment: Judgment normal.           Informed consent for procedure was personally discussed, reviewed, and signed by Dr. Navi Sam. Discussion by Dr. Navi Sam was carried out regarding risks, benefits, and alternatives with the patient. Risks include but are not limited to:  bleeding, infection, and delayed wound healing or an open wound, pulmonary embolus, leaks from bowel or bile ducts or other viscus, transfusions, death. Discussed in further detail the more common complications and their rates of occurrence.  was used if necessary.   Patient expressed understanding of the issues discussed and wished/consented to proceed. All questions were answered by Dr. Giselle Nova. Discussion performed between patient and the provider signing below.      Signature:   Luisana Storm  Date: 6/29/2023 Time: 11:44 AM

## 2023-06-29 NOTE — PROGRESS NOTES
"Assessment/Plan:   Flory Bailey is a 79 y o male who is here for a: Screening Colonoscopy  No changes in medical history or medications  Plan: Colonoscopy for: Screening for Colon Cancer      Previous Colonoscopy and  Location of polyps if any:   10 years ago      Preoperative Clearance: None      ____________________________________________________    HPI:  Flory Bailey is a 79 y o male who was referred for evaluation of    Screening  Currently:     Symptoms include:  no abdominal pain, change in bowel habits, or black or bloody stools  Family history of colon cancer: mother had colon cancer at [de-identified]       Anticoagulation: ASA for afib    Recent A1C: none      LABS:      Lab Results   Component Value Date    WBC 4 57 04/11/2023    HGB 12 6 04/11/2023    HCT 39 1 04/11/2023    MCV 95 04/11/2023     04/11/2023     Lab Results   Component Value Date    K 4 5 04/11/2023     04/11/2023    CO2 25 04/11/2023    BUN 16 04/11/2023    CREATININE 1 17 04/11/2023    GLUF 98 04/11/2023    CALCIUM 8 9 04/11/2023    AST 15 04/11/2023    ALT 18 04/11/2023    ALKPHOS 45 (L) 04/11/2023    EGFR 65 04/11/2023     No results found for: \"HGBA1C\"  No results found for: \"INR\", \"PROTIME\"      No orders to display         Review of Systems   Constitutional: Negative for chills, diaphoresis, fever and unexpected weight change  Respiratory: Negative for chest tightness and shortness of breath  Cardiovascular: Negative for chest pain, palpitations and leg swelling  Gastrointestinal: Negative for abdominal pain, anal bleeding, blood in stool, constipation, diarrhea, nausea, rectal pain and vomiting  Genitourinary: Negative for difficulty urinating and frequency  Skin: Negative for color change, rash and wound  Neurological: Negative for weakness and numbness  Hematological: Does not bruise/bleed easily  Psychiatric/Behavioral: Negative for confusion  The patient is not nervous/anxious    " Imaging: No new pertinent imaging studies  Patient Active Problem List   Diagnosis   • Chronic a-fib (HCC)   • Shingles   • Hypertension   • Postherpetic neuralgia   • Drug-induced erectile dysfunction   • Other hyperlipidemia   • Anxiety         Allergies:  Patient has no known allergies  Current Outpatient Medications:   •  gabapentin (Neurontin) 100 mg capsule, Take 1 capsule (100 mg total) by mouth 3 (three) times a day, Disp: 90 capsule, Rfl: 5  •  metoprolol tartrate (LOPRESSOR) 25 mg tablet, Take 25 mg by mouth 2 (two) times a day, Disp: , Rfl:   •  lidocaine (Lidoderm) 5 %, Apply 1 patch topically over 12 hours daily Remove & Discard patch within 12 hours or as directed by MD (Patient not taking: Reported on 6/29/2023), Disp: 30 patch, Rfl: 2    Past Medical History:   Diagnosis Date   • Hypertension        No past surgical history on file      Family History   Problem Relation Age of Onset   • Colon cancer Mother    • Diabetes Father        Social History     Socioeconomic History   • Marital status: Single     Spouse name: None   • Number of children: None   • Years of education: None   • Highest education level: None   Occupational History   • None   Tobacco Use   • Smoking status: Never   • Smokeless tobacco: Never   Vaping Use   • Vaping Use: Never used   Substance and Sexual Activity   • Alcohol use: Not Currently   • Drug use: Never   • Sexual activity: None   Other Topics Concern   • None   Social History Narrative   • None     Social Determinants of Health     Financial Resource Strain: Not on file   Food Insecurity: Not on file   Transportation Needs: Not on file   Physical Activity: Not on file   Stress: Not on file   Social Connections: Not on file   Intimate Partner Violence: Not on file   Housing Stability: Not on file       Exam:   There were no vitals filed for this visit         ______________________________________________________    Physical Exam  Vitals and nursing note reviewed  Exam conducted with a chaperone present  Constitutional:       General: He is not in acute distress  Appearance: Normal appearance  He is normal weight  He is not ill-appearing, toxic-appearing or diaphoretic  HENT:      Head: Normocephalic and atraumatic  Cardiovascular:      Rate and Rhythm: Normal rate and regular rhythm  Pulses: Normal pulses  Heart sounds: Normal heart sounds  No murmur heard  No gallop  Pulmonary:      Effort: Pulmonary effort is normal       Breath sounds: Normal breath sounds  Abdominal:      General: Abdomen is flat  There is no distension  Palpations: Abdomen is soft  There is no mass  Tenderness: There is no abdominal tenderness  There is no guarding or rebound  Hernia: No hernia is present  Comments: Patient does have healing scarred shingles over his left lower chest along to his back  Musculoskeletal:         General: No swelling or tenderness  Lymphadenopathy:      Cervical: No cervical adenopathy  Skin:     General: Skin is warm and dry  Neurological:      General: No focal deficit present  Mental Status: He is alert and oriented to person, place, and time  Mental status is at baseline  Psychiatric:         Mood and Affect: Mood normal          Behavior: Behavior normal          Thought Content: Thought content normal          Judgment: Judgment normal            Informed consent for procedure was personally discussed, reviewed, and signed by Dr Charles Ann  Discussion by Dr Charles Ann was carried out regarding risks, benefits, and alternatives with the patient  Risks include but are not limited to:  bleeding, infection, and delayed wound healing or an open wound, pulmonary embolus, leaks from bowel or bile ducts or other viscus, transfusions, death  Discussed in further detail the more common complications and their rates of occurrence   was used if necessary    Patient expressed understanding of the issues discussed and wished/consented to proceed  All questions were answered by Dr Sarita Lara  Discussion performed between patient and the provider signing below       Signature:   Chaparro Freitas  Date: 2023 Time: 11:44 AM

## 2023-07-10 RX ORDER — SODIUM CHLORIDE 9 MG/ML
125 INJECTION, SOLUTION INTRAVENOUS CONTINUOUS
Status: CANCELLED | OUTPATIENT
Start: 2023-07-10

## 2023-07-11 ENCOUNTER — HOSPITAL ENCOUNTER (OUTPATIENT)
Dept: GASTROENTEROLOGY | Facility: HOSPITAL | Age: 68
Setting detail: OUTPATIENT SURGERY
Discharge: HOME | End: 2023-07-11
Attending: SURGERY
Payer: MEDICARE

## 2023-07-11 ENCOUNTER — ANESTHESIA (OUTPATIENT)
Dept: GASTROENTEROLOGY | Facility: HOSPITAL | Age: 68
End: 2023-07-11

## 2023-07-11 ENCOUNTER — ANESTHESIA EVENT (OUTPATIENT)
Dept: GASTROENTEROLOGY | Facility: HOSPITAL | Age: 68
End: 2023-07-11

## 2023-07-11 VITALS
TEMPERATURE: 97.7 F | HEART RATE: 88 BPM | DIASTOLIC BLOOD PRESSURE: 63 MMHG | RESPIRATION RATE: 21 BRPM | SYSTOLIC BLOOD PRESSURE: 97 MMHG | OXYGEN SATURATION: 100 %

## 2023-07-11 DIAGNOSIS — Z12.11 ENCOUNTER FOR SCREENING COLONOSCOPY: ICD-10-CM

## 2023-07-11 PROCEDURE — 88305 TISSUE EXAM BY PATHOLOGIST: CPT | Performed by: STUDENT IN AN ORGANIZED HEALTH CARE EDUCATION/TRAINING PROGRAM

## 2023-07-11 PROCEDURE — 45380 COLONOSCOPY AND BIOPSY: CPT | Performed by: SURGERY

## 2023-07-11 RX ORDER — PROPOFOL 10 MG/ML
INJECTION, EMULSION INTRAVENOUS AS NEEDED
Status: DISCONTINUED | OUTPATIENT
Start: 2023-07-11 | End: 2023-07-11

## 2023-07-11 RX ORDER — SODIUM CHLORIDE 9 MG/ML
125 INJECTION, SOLUTION INTRAVENOUS CONTINUOUS
Status: DISCONTINUED | OUTPATIENT
Start: 2023-07-11 | End: 2023-07-15 | Stop reason: HOSPADM

## 2023-07-11 RX ADMIN — SODIUM CHLORIDE 125 ML/HR: 0.9 INJECTION, SOLUTION INTRAVENOUS at 07:18

## 2023-07-11 RX ADMIN — PROPOFOL 50 MG: 10 INJECTION, EMULSION INTRAVENOUS at 07:40

## 2023-07-11 RX ADMIN — PROPOFOL 150 MG: 10 INJECTION, EMULSION INTRAVENOUS at 07:37

## 2023-07-11 RX ADMIN — PROPOFOL 30 MG: 10 INJECTION, EMULSION INTRAVENOUS at 07:47

## 2023-07-11 RX ADMIN — PROPOFOL 30 MG: 10 INJECTION, EMULSION INTRAVENOUS at 07:45

## 2023-07-11 RX ADMIN — PROPOFOL 30 MG: 10 INJECTION, EMULSION INTRAVENOUS at 07:50

## 2023-07-11 RX ADMIN — PROPOFOL 50 MG: 10 INJECTION, EMULSION INTRAVENOUS at 07:42

## 2023-07-11 NOTE — ANESTHESIA POSTPROCEDURE EVALUATION
Post-Op Assessment Note    CV Status:  Stable  Pain Score: 0    Pain management: adequate     Mental Status:  Alert and awake   Hydration Status:  Euvolemic   PONV Controlled:  Controlled   Airway Patency:  Patent      Post Op Vitals Reviewed: Yes      Staff: Anesthesiologist         No notable events documented.     BP      Temp      Pulse     Resp      SpO2      BP 97/63   Pulse 88   Temp 97.7 °F (36.5 °C) (Temporal)   Resp 21   SpO2 100%

## 2023-07-11 NOTE — ANESTHESIA PREPROCEDURE EVALUATION
Procedure:  COLONOSCOPY    Relevant Problems   ANESTHESIA (within normal limits)      CARDIO  Left Ventricle   Left ventricle is normal in size. Wall thickness is normal. Systolic function is normal with an ejection fraction of 60-65%. Wall motion is within normal limits. Left atrial pressure is normal     (+) Chronic a-fib (HCC)   (+) Hypertension   (+) Other hyperlipidemia      GI/HEPATIC (within normal limits)      HEMATOLOGY (within normal limits)      NEURO/PSYCH   (+) Anxiety      PULMONARY (within normal limits)        Physical Exam    Airway    Mallampati score: II         Dental   No notable dental hx     Cardiovascular  Rhythm: regular, Rate: normal, Cardiovascular exam normal    Pulmonary  Pulmonary exam normal Breath sounds clear to auscultation,     Other Findings        Anesthesia Plan  ASA Score- 3     Anesthesia Type- general with ASA Monitors. Additional Monitors:   Airway Plan:           Plan Factors-    Chart reviewed. Existing labs reviewed. Patient summary reviewed. Induction- intravenous. Postoperative Plan-     Informed Consent- Anesthetic plan and risks discussed with patient.

## 2023-07-11 NOTE — DISCHARGE INSTR - AVS FIRST PAGE
[facility]  Endoscopy Post-Operative Instructions  Dr. Kang Montero MD, FACS    Procedure: Colonoscopy    Findings:  Diverticulosis and Colon Polyp(s)    Follow-Up: You will need a repeat Endoscopy in (generally)5 years. Will await final pathology report for final determination of number of years until your follow up endoscopy, if you had polyps on this exam.  Different types of polyps require different lengths of follow up surveillance. Please call our office or your primary doctor's office if you have any questions, once the report is returned. You should have an endoscopy sooner than recommended if you have any symptoms of bleeding or change in stools or other concerns. You will receive a call from our office with your results, in addition to the the preliminary results you received today. You will usually receive a follow-up letter from our office in 1-2 weeks. Call the office if you do not hear from us. You are welcome to also schedule an office visit if desired to discuss the results further. It is your responsibility to contact our office for results in 1- 2 weeks if you do not hear from us. If a follow up endoscopy is needed, you are responsible for arranging that follow up appointment at the appropriate time. The office may or may not issue a reminder at that future time. Please take responsibility for your own follow up healthcare. Diet: Eat a light snack first, and then resume your previous diet. Call the office if you have unusual fevers, chills, nausea or vomiting or abdominal pain. Report to the emergency room with these are severe in nature. Activity: Do not drive a car, operate machinery, or sign legal documents for 24 hours after your procedure. Normal activity may be resumed on the day following the procedure.      Call the office at [phone] for any of the following: Severe abdominal pain, significant rectal bleeding, chills, or fever above 100°, new onset of persistent cough or persistent vomiting.      [address], [address]  [facility], [address] [place] [address]  Phone: [phone]

## 2023-07-11 NOTE — INTERVAL H&P NOTE
H&P reviewed. After examining the patient I find no changes in the patients condition since the H&P had been written.     Vitals:    07/11/23 0711   BP: 112/89   Pulse: 88   Resp: 15   Temp: 97.7 °F (36.5 °C)   SpO2: 100%

## 2023-07-13 ENCOUNTER — TELEPHONE (OUTPATIENT)
Dept: SURGERY | Facility: CLINIC | Age: 68
End: 2023-07-13

## 2023-07-13 NOTE — TELEPHONE ENCOUNTER
Colonoscopy follow up     Left message for patient to call the office if he has an questions or concerns.

## 2023-07-18 PROCEDURE — 88305 TISSUE EXAM BY PATHOLOGIST: CPT | Performed by: STUDENT IN AN ORGANIZED HEALTH CARE EDUCATION/TRAINING PROGRAM

## 2023-07-18 NOTE — RESULT ENCOUNTER NOTE
Please call patient with these results. Only hyperplastic or non polyps found. These are generally benign with minimal to no pre malignant potential.  Generally 7-10 year follow-up is recommended. This may be modified by family history or symptoms before that time frame.

## 2023-10-30 DIAGNOSIS — B02.29 POSTHERPETIC NEURALGIA: ICD-10-CM

## 2023-10-30 RX ORDER — GABAPENTIN 100 MG/1
100 CAPSULE ORAL 3 TIMES DAILY
Qty: 90 CAPSULE | Refills: 5 | Status: SHIPPED | OUTPATIENT
Start: 2023-10-30

## 2023-11-06 ENCOUNTER — OFFICE VISIT (OUTPATIENT)
Dept: FAMILY MEDICINE CLINIC | Facility: CLINIC | Age: 68
End: 2023-11-06
Payer: MEDICARE

## 2023-11-06 VITALS
DIASTOLIC BLOOD PRESSURE: 78 MMHG | RESPIRATION RATE: 16 BRPM | BODY MASS INDEX: 27.54 KG/M2 | WEIGHT: 192.4 LBS | HEIGHT: 70 IN | HEART RATE: 86 BPM | TEMPERATURE: 98.6 F | OXYGEN SATURATION: 99 % | SYSTOLIC BLOOD PRESSURE: 118 MMHG

## 2023-11-06 DIAGNOSIS — E78.49 OTHER HYPERLIPIDEMIA: Primary | ICD-10-CM

## 2023-11-06 DIAGNOSIS — I48.20 CHRONIC A-FIB (HCC): ICD-10-CM

## 2023-11-06 DIAGNOSIS — B02.29 POSTHERPETIC NEURALGIA: ICD-10-CM

## 2023-11-06 DIAGNOSIS — I10 PRIMARY HYPERTENSION: ICD-10-CM

## 2023-11-06 DIAGNOSIS — F41.9 ANXIETY: ICD-10-CM

## 2023-11-06 PROBLEM — N52.2 DRUG-INDUCED ERECTILE DYSFUNCTION: Status: RESOLVED | Noted: 2023-04-03 | Resolved: 2023-11-06

## 2023-11-06 PROCEDURE — 99214 OFFICE O/P EST MOD 30 MIN: CPT | Performed by: FAMILY MEDICINE

## 2023-11-06 RX ORDER — GABAPENTIN 300 MG/1
300 CAPSULE ORAL
Qty: 90 CAPSULE | Refills: 1 | Status: SHIPPED | OUTPATIENT
Start: 2023-11-06

## 2023-11-06 RX ORDER — METOPROLOL SUCCINATE 25 MG/1
25 TABLET, EXTENDED RELEASE ORAL DAILY
Qty: 90 TABLET | Refills: 1 | Status: SHIPPED | OUTPATIENT
Start: 2023-11-06

## 2023-11-07 NOTE — ASSESSMENT & PLAN NOTE
Well-controlled on metoprolol. He said he would rather take once daily I am going to switch him on Toprol-XL 25 mg 1 daily. Discussed with possible side effects.

## 2023-11-07 NOTE — ASSESSMENT & PLAN NOTE
Not very well controlled. I am going to add Neurontin 300 mg for bedtime and continue 100 mg 2-3 times during the day. Discussed with possible side effects. Continue to monitor.

## 2023-11-14 ENCOUNTER — APPOINTMENT (OUTPATIENT)
Dept: LAB | Age: 68
End: 2023-11-14
Payer: MEDICARE

## 2023-11-14 DIAGNOSIS — I10 PRIMARY HYPERTENSION: ICD-10-CM

## 2023-11-14 LAB
ALBUMIN SERPL BCP-MCNC: 4.3 G/DL (ref 3.5–5)
ALP SERPL-CCNC: 53 U/L (ref 34–104)
ALT SERPL W P-5'-P-CCNC: 11 U/L (ref 7–52)
ANION GAP SERPL CALCULATED.3IONS-SCNC: 9 MMOL/L
AST SERPL W P-5'-P-CCNC: 16 U/L (ref 13–39)
BASOPHILS # BLD AUTO: 0.05 THOUSANDS/ÂΜL (ref 0–0.1)
BASOPHILS NFR BLD AUTO: 1 % (ref 0–1)
BILIRUB SERPL-MCNC: 0.91 MG/DL (ref 0.2–1)
BUN SERPL-MCNC: 18 MG/DL (ref 5–25)
CALCIUM SERPL-MCNC: 9.6 MG/DL (ref 8.4–10.2)
CHLORIDE SERPL-SCNC: 103 MMOL/L (ref 96–108)
CHOLEST SERPL-MCNC: 150 MG/DL
CO2 SERPL-SCNC: 26 MMOL/L (ref 21–32)
CREAT SERPL-MCNC: 1.08 MG/DL (ref 0.6–1.3)
EOSINOPHIL # BLD AUTO: 0.1 THOUSAND/ÂΜL (ref 0–0.61)
EOSINOPHIL NFR BLD AUTO: 2 % (ref 0–6)
ERYTHROCYTE [DISTWIDTH] IN BLOOD BY AUTOMATED COUNT: 12.6 % (ref 11.6–15.1)
GFR SERPL CREATININE-BSD FRML MDRD: 71 ML/MIN/1.73SQ M
GLUCOSE P FAST SERPL-MCNC: 88 MG/DL (ref 65–99)
HCT VFR BLD AUTO: 39.9 % (ref 36.5–49.3)
HDLC SERPL-MCNC: 49 MG/DL
HGB BLD-MCNC: 12.9 G/DL (ref 12–17)
IMM GRANULOCYTES # BLD AUTO: 0.03 THOUSAND/UL (ref 0–0.2)
IMM GRANULOCYTES NFR BLD AUTO: 1 % (ref 0–2)
LDLC SERPL CALC-MCNC: 92 MG/DL (ref 0–100)
LYMPHOCYTES # BLD AUTO: 1.07 THOUSANDS/ÂΜL (ref 0.6–4.47)
LYMPHOCYTES NFR BLD AUTO: 22 % (ref 14–44)
MCH RBC QN AUTO: 29.4 PG (ref 26.8–34.3)
MCHC RBC AUTO-ENTMCNC: 32.3 G/DL (ref 31.4–37.4)
MCV RBC AUTO: 91 FL (ref 82–98)
MONOCYTES # BLD AUTO: 0.52 THOUSAND/ÂΜL (ref 0.17–1.22)
MONOCYTES NFR BLD AUTO: 11 % (ref 4–12)
NEUTROPHILS # BLD AUTO: 3.2 THOUSANDS/ÂΜL (ref 1.85–7.62)
NEUTS SEG NFR BLD AUTO: 63 % (ref 43–75)
NRBC BLD AUTO-RTO: 0 /100 WBCS
PLATELET # BLD AUTO: 241 THOUSANDS/UL (ref 149–390)
PMV BLD AUTO: 9.4 FL (ref 8.9–12.7)
POTASSIUM SERPL-SCNC: 5 MMOL/L (ref 3.5–5.3)
PROT SERPL-MCNC: 7.1 G/DL (ref 6.4–8.4)
RBC # BLD AUTO: 4.39 MILLION/UL (ref 3.88–5.62)
SODIUM SERPL-SCNC: 138 MMOL/L (ref 135–147)
TRIGL SERPL-MCNC: 44 MG/DL
TSH SERPL DL<=0.05 MIU/L-ACNC: 2.6 UIU/ML (ref 0.45–4.5)
WBC # BLD AUTO: 4.97 THOUSAND/UL (ref 4.31–10.16)

## 2023-11-14 PROCEDURE — 84443 ASSAY THYROID STIM HORMONE: CPT

## 2023-11-14 PROCEDURE — 80061 LIPID PANEL: CPT

## 2023-11-14 PROCEDURE — 85025 COMPLETE CBC W/AUTO DIFF WBC: CPT

## 2023-11-14 PROCEDURE — 36415 COLL VENOUS BLD VENIPUNCTURE: CPT

## 2023-11-14 PROCEDURE — 80053 COMPREHEN METABOLIC PANEL: CPT

## 2024-01-04 ENCOUNTER — TELEPHONE (OUTPATIENT)
Dept: FAMILY MEDICINE CLINIC | Facility: CLINIC | Age: 69
End: 2024-01-04

## 2024-05-01 DIAGNOSIS — I10 PRIMARY HYPERTENSION: ICD-10-CM

## 2024-05-03 RX ORDER — METOPROLOL SUCCINATE 25 MG/1
25 TABLET, EXTENDED RELEASE ORAL DAILY
Qty: 90 TABLET | Refills: 1 | Status: SHIPPED | OUTPATIENT
Start: 2024-05-03

## 2024-05-04 DIAGNOSIS — B02.29 POSTHERPETIC NEURALGIA: ICD-10-CM

## 2024-05-04 DIAGNOSIS — E78.49 OTHER HYPERLIPIDEMIA: ICD-10-CM

## 2024-05-05 RX ORDER — GABAPENTIN 300 MG/1
300 CAPSULE ORAL
Qty: 90 CAPSULE | Refills: 1 | Status: SHIPPED | OUTPATIENT
Start: 2024-05-05

## 2024-08-09 ENCOUNTER — OFFICE VISIT (OUTPATIENT)
Dept: FAMILY MEDICINE CLINIC | Facility: CLINIC | Age: 69
End: 2024-08-09
Payer: MEDICARE

## 2024-08-09 VITALS
WEIGHT: 177 LBS | DIASTOLIC BLOOD PRESSURE: 78 MMHG | OXYGEN SATURATION: 97 % | HEART RATE: 95 BPM | RESPIRATION RATE: 16 BRPM | TEMPERATURE: 97.9 F | HEIGHT: 70 IN | SYSTOLIC BLOOD PRESSURE: 126 MMHG | BODY MASS INDEX: 25.34 KG/M2

## 2024-08-09 DIAGNOSIS — F41.9 ANXIETY: ICD-10-CM

## 2024-08-09 DIAGNOSIS — I10 PRIMARY HYPERTENSION: ICD-10-CM

## 2024-08-09 DIAGNOSIS — I48.20 CHRONIC A-FIB (HCC): Primary | ICD-10-CM

## 2024-08-09 DIAGNOSIS — Z12.5 PROSTATE CANCER SCREENING: ICD-10-CM

## 2024-08-09 DIAGNOSIS — B02.29 POSTHERPETIC NEURALGIA: ICD-10-CM

## 2024-08-09 DIAGNOSIS — E78.49 OTHER HYPERLIPIDEMIA: ICD-10-CM

## 2024-08-09 DIAGNOSIS — Z00.00 MEDICARE ANNUAL WELLNESS VISIT, SUBSEQUENT: ICD-10-CM

## 2024-08-09 PROCEDURE — 99214 OFFICE O/P EST MOD 30 MIN: CPT | Performed by: FAMILY MEDICINE

## 2024-08-09 PROCEDURE — G0439 PPPS, SUBSEQ VISIT: HCPCS | Performed by: FAMILY MEDICINE

## 2024-08-09 PROCEDURE — G0444 DEPRESSION SCREEN ANNUAL: HCPCS | Performed by: FAMILY MEDICINE

## 2024-08-09 RX ORDER — PROPRANOLOL HYDROCHLORIDE 20 MG/1
20 TABLET ORAL EVERY 12 HOURS SCHEDULED
Qty: 180 TABLET | Refills: 1 | Status: SHIPPED | OUTPATIENT
Start: 2024-08-09

## 2024-08-09 NOTE — ASSESSMENT & PLAN NOTE
Discussed diet, exercise, immunizations and safety measures. Repeat baseline labs scheduled for next visit.

## 2024-08-09 NOTE — PROGRESS NOTES
Ambulatory Visit  Name: Suraj Hernandez      : 1955      MRN: [id number]  Encounter Provider: Guerda Hutchins MD  Encounter Date: 2024   Encounter department: [facility]    Assessment & Plan   1. Chronic a-fib (HCC)  Assessment & Plan:  Stable.  Asymptomatic.  I will switch him to Inderal 20 mg twice a day.  He stated he uses it only as needed.  Was told to contact cardiology.  Orders:  -     propranolol (INDERAL) 20 mg tablet; Take 1 tablet (20 mg total) by mouth every 12 (twelve) hours  2. Other hyperlipidemia  Assessment & Plan:  Discussed about low-fat diet and regular exercise.  Continue to monitor fasting lipid profile.  Orders:  -     Comprehensive metabolic panel; Future  -     Lipid Panel with Direct LDL reflex; Future  -     CBC and differential; Future  -     TSH, 3rd generation with Free T4 reflex; Future  3. Primary hypertension  Assessment & Plan:  Well-controlled.  I am going to switch him to Inderal 20 mg twice a day.  Orders:  -     propranolol (INDERAL) 20 mg tablet; Take 1 tablet (20 mg total) by mouth every 12 (twelve) hours  -     Comprehensive metabolic panel; Future  -     Lipid Panel with Direct LDL reflex; Future  -     CBC and differential; Future  -     TSH, 3rd generation with Free T4 reflex; Future  4. Anxiety  Assessment & Plan:  He was given prescription for propranolol and was told to continue on gabapentin.  Orders:  -     propranolol (INDERAL) 20 mg tablet; Take 1 tablet (20 mg total) by mouth every 12 (twelve) hours  5. Medicare annual wellness visit, subsequent  Assessment & Plan:  Discussed diet, exercise, immunizations and safety measures. Repeat baseline labs scheduled for next visit.   6. Prostate cancer screening  -     PSA, Total Screen; Future  7. Postherpetic neuralgia  Assessment & Plan:  Fair control on gabapentin.  Continue same.  Will continue to monitor.       Preventive health issues were discussed with patient, and age  appropriate screening tests were ordered as noted in patient's After Visit Summary. Personalized health advice and appropriate referrals for health education or preventive services given if needed, as noted in patient's After Visit Summary.    History of Present Illness     He is here today for follow-up multiple medical problems.  He has been taking his medication.  Denies any side effect.  He is requesting to change his medication to Inderal to help with his anxiety.  He stated he take his metoprolol only as needed for his A-fib but he rarely needs it.  He has been taking baby aspirin.       Patient Care Team:  Guerda Hutchins MD as PCP - General (Family Medicine)    Review of Systems   Constitutional:  Negative for chills and fever.   HENT:  Negative for trouble swallowing.    Eyes:  Negative for visual disturbance.   Respiratory:  Negative for cough and shortness of breath.    Cardiovascular:  Negative for chest pain and palpitations.   Gastrointestinal:  Negative for abdominal pain, blood in stool and vomiting.   Endocrine: Negative for cold intolerance and heat intolerance.   Genitourinary:  Negative for difficulty urinating and dysuria.   Musculoskeletal:  Negative for gait problem.   Skin:  Negative for rash.   Neurological:  Negative for dizziness, syncope and headaches.   Hematological:  Negative for adenopathy.   Psychiatric/Behavioral:  Negative for behavioral problems. The patient is nervous/anxious.      Medical History Reviewed by provider this encounter:  Tobacco  Allergies  Meds  Problems  Med Hx  Surg Hx  Fam Hx       Annual Wellness Visit Questionnaire   Suraj is here for his Subsequent Wellness visit.     Health Risk Assessment:   Patient rates overall health as very good. Patient feels that their physical health rating is slightly better. Patient is satisfied with their life. Eyesight was rated as slightly worse. Hearing was rated as same. Patient feels that their emotional and mental  health rating is slightly better. Patients states they are never, rarely angry. Patient states they are never, rarely unusually tired/fatigued. Pain experienced in the last 7 days has been some. Patient's pain rating has been 3/10. Patient states that he has experienced no weight loss or gain in last 6 months.     Depression Screening:   PHQ-2 Score: 0      Fall Risk Screening:   In the past year, patient has experienced: no history of falling in past year      Home Safety:  Patient does not have trouble with stairs inside or outside of their home. Patient has working smoke alarms and has no working carbon monoxide detector. Home safety hazards include: none.     Nutrition:   Current diet is Low Cholesterol, Low Saturated Fat, Low Carb, No Added Salt and Limited junk food.     Medications:   Patient is not currently taking any over-the-counter supplements. Patient is able to manage medications.     Activities of Daily Living (ADLs)/Instrumental Activities of Daily Living (IADLs):   Walk and transfer into and out of bed and chair?: Yes  Dress and groom yourself?: Yes    Bathe or shower yourself?: Yes    Feed yourself? Yes  Do your laundry/housekeeping?: Yes  Manage your money, pay your bills and track your expenses?: Yes  Make your own meals?: Yes    Do your own shopping?: Yes    Previous Hospitalizations:   Any hospitalizations or ED visits within the last 12 months?: No      Advance Care Planning:   Living will: No    Durable POA for healthcare: No    Advanced directive: No      Cognitive Screening:   Provider or family/friend/caregiver concerned regarding cognition?: No    PREVENTIVE SCREENINGS      Cardiovascular Screening:    General: Screening Not Indicated and History Lipid Disorder      Diabetes Screening:     General: Screening Current      Colorectal Cancer Screening:     General: Screening Current      Prostate Cancer Screening:    General: Risks and Benefits Discussed      Osteoporosis Screening:     General: Risks and Benefits Discussed      Abdominal Aortic Aneurysm (AAA) Screening:    Risk factors include: age between 65-74 yo        General: Risks and Benefits Discussed      Lung Cancer Screening:     General: Screening Not Indicated      Hepatitis C Screening:    General: Risks and Benefits Discussed    Screening, Brief Intervention, and Referral to Treatment (SBIRT)    Screening  Typical number of drinks in a day: 0  Typical number of drinks in a week: 0  Interpretation: Low risk drinking behavior.    AUDIT-C Screenin) How often did you have a drink containing alcohol in the past year? never  2) How many drinks did you have on a typical day when you were drinking in the past year? 0  3) How often did you have 6 or more drinks on one occasion in the past year? never    AUDIT-C Score: 0  Interpretation: Score 0-3 (male): Negative screen for alcohol misuse    Single Item Drug Screening:  How often have you used an illegal drug (including marijuana) or a prescription medication for non-medical reasons in the past year? never    Single Item Drug Screen Score: 0  Interpretation: Negative screen for possible drug use disorder    Brief Intervention  Alcohol & drug use screenings were reviewed. No concerns regarding substance use disorder identified.     Annual Depression Screening  Time spent screening and evaluating the patient for depression during today's encounter was 7 minutes.    Other Counseling Topics:   Calcium and vitamin D intake and regular weightbearing exercise.     Social Determinants of Health     Food Insecurity: No Food Insecurity (2024)    Hunger Vital Sign    • Worried About Running Out of Food in the Last Year: Never true    • Ran Out of Food in the Last Year: Never true   Transportation Needs: No Transportation Needs (2024)    PRAPARE - Transportation    • Lack of Transportation (Medical): No    • Lack of Transportation (Non-Medical): No   Housing Stability: Low Risk  (2024)  "   Housing Stability Vital Sign    • Unable to Pay for Housing in the Last Year: No    • Number of Times Moved in the Last Year: 0    • Homeless in the Last Year: No   Utilities: Not At Risk (8/7/2024)    [facility]    • Threatened with loss of utilities: No     No results found.    Objective     /78 (BP Location: Left arm, Patient Position: Sitting, Cuff Size: Adult)   Pulse 95   Temp 97.9 °F (36.6 °C) (Tympanic)   Resp 16   Ht 5' 10\" (1.778 m)   Wt 80.3 kg (177 lb)   SpO2 97%   BMI 25.40 kg/m²     Physical Exam  Vitals and nursing note reviewed.   Constitutional:       Appearance: He is well-developed.   HENT:      Head: Normocephalic and atraumatic.   Eyes:      Pupils: Pupils are equal, round, and reactive to light.   Cardiovascular:      Rate and Rhythm: Normal rate and regular rhythm.      Heart sounds: Normal heart sounds.   Pulmonary:      Effort: Pulmonary effort is normal.      Breath sounds: Normal breath sounds.   Abdominal:      General: Bowel sounds are normal.      Palpations: Abdomen is soft.   Musculoskeletal:      Cervical back: Normal range of motion and neck supple.   Lymphadenopathy:      Cervical: No cervical adenopathy.   Skin:     General: Skin is warm.      Findings: No rash.   Neurological:      Mental Status: He is alert and oriented to person, place, and time.           "

## 2024-08-09 NOTE — ASSESSMENT & PLAN NOTE
Stable.  Asymptomatic.  I will switch him to Inderal 20 mg twice a day.  He stated he uses it only as needed.  Was told to contact cardiology.

## 2024-09-08 PROBLEM — Z00.00 MEDICARE ANNUAL WELLNESS VISIT, SUBSEQUENT: Status: RESOLVED | Noted: 2023-04-03 | Resolved: 2024-09-08

## 2025-01-27 ENCOUNTER — TELEPHONE (OUTPATIENT)
Age: 70
End: 2025-01-27

## 2025-01-27 NOTE — TELEPHONE ENCOUNTER
Pt inquired if print out of labs will be needed as he was not sure if he received them during last appt. Advised pt if going to  lab they will be able to find orders in his chart, but for outside lab we can faxe orders or he can  paper work. Pt advised he will go to  and assisted pt with location to nearest lab and hours.

## 2025-02-04 ENCOUNTER — APPOINTMENT (OUTPATIENT)
Dept: LAB | Age: 70
End: 2025-02-04
Payer: MEDICARE

## 2025-02-04 DIAGNOSIS — E78.49 OTHER HYPERLIPIDEMIA: ICD-10-CM

## 2025-02-04 DIAGNOSIS — I10 PRIMARY HYPERTENSION: ICD-10-CM

## 2025-02-04 DIAGNOSIS — Z12.5 PROSTATE CANCER SCREENING: ICD-10-CM

## 2025-02-04 LAB
ALBUMIN SERPL BCG-MCNC: 4.2 G/DL (ref 3.5–5)
ALP SERPL-CCNC: 53 U/L (ref 34–104)
ALT SERPL W P-5'-P-CCNC: 13 U/L (ref 7–52)
ANION GAP SERPL CALCULATED.3IONS-SCNC: 6 MMOL/L (ref 4–13)
AST SERPL W P-5'-P-CCNC: 15 U/L (ref 13–39)
BASOPHILS # BLD AUTO: 0.05 THOUSANDS/ΜL (ref 0–0.1)
BASOPHILS NFR BLD AUTO: 1 % (ref 0–1)
BILIRUB SERPL-MCNC: 1.13 MG/DL (ref 0.2–1)
BUN SERPL-MCNC: 19 MG/DL (ref 5–25)
CALCIUM SERPL-MCNC: 9.3 MG/DL (ref 8.4–10.2)
CHLORIDE SERPL-SCNC: 103 MMOL/L (ref 96–108)
CHOLEST SERPL-MCNC: 158 MG/DL (ref ?–200)
CO2 SERPL-SCNC: 29 MMOL/L (ref 21–32)
CREAT SERPL-MCNC: 1.1 MG/DL (ref 0.6–1.3)
EOSINOPHIL # BLD AUTO: 0.24 THOUSAND/ΜL (ref 0–0.61)
EOSINOPHIL NFR BLD AUTO: 5 % (ref 0–6)
ERYTHROCYTE [DISTWIDTH] IN BLOOD BY AUTOMATED COUNT: 12.3 % (ref 11.6–15.1)
GFR SERPL CREATININE-BSD FRML MDRD: 69 ML/MIN/1.73SQ M
GLUCOSE P FAST SERPL-MCNC: 89 MG/DL (ref 65–99)
HCT VFR BLD AUTO: 40.8 % (ref 36.5–49.3)
HDLC SERPL-MCNC: 51 MG/DL
HGB BLD-MCNC: 13.5 G/DL (ref 12–17)
IMM GRANULOCYTES # BLD AUTO: 0.03 THOUSAND/UL (ref 0–0.2)
IMM GRANULOCYTES NFR BLD AUTO: 1 % (ref 0–2)
LDLC SERPL CALC-MCNC: 97 MG/DL (ref 0–100)
LYMPHOCYTES # BLD AUTO: 1.56 THOUSANDS/ΜL (ref 0.6–4.47)
LYMPHOCYTES NFR BLD AUTO: 31 % (ref 14–44)
MCH RBC QN AUTO: 29.8 PG (ref 26.8–34.3)
MCHC RBC AUTO-ENTMCNC: 33.1 G/DL (ref 31.4–37.4)
MCV RBC AUTO: 90 FL (ref 82–98)
MONOCYTES # BLD AUTO: 0.44 THOUSAND/ΜL (ref 0.17–1.22)
MONOCYTES NFR BLD AUTO: 9 % (ref 4–12)
NEUTROPHILS # BLD AUTO: 2.71 THOUSANDS/ΜL (ref 1.85–7.62)
NEUTS SEG NFR BLD AUTO: 53 % (ref 43–75)
NRBC BLD AUTO-RTO: 0 /100 WBCS
PLATELET # BLD AUTO: 244 THOUSANDS/UL (ref 149–390)
PMV BLD AUTO: 9.3 FL (ref 8.9–12.7)
POTASSIUM SERPL-SCNC: 4.7 MMOL/L (ref 3.5–5.3)
PROT SERPL-MCNC: 6.3 G/DL (ref 6.4–8.4)
PSA SERPL-MCNC: 1.51 NG/ML (ref 0–4)
RBC # BLD AUTO: 4.53 MILLION/UL (ref 3.88–5.62)
SODIUM SERPL-SCNC: 138 MMOL/L (ref 135–147)
TRIGL SERPL-MCNC: 48 MG/DL (ref ?–150)
TSH SERPL DL<=0.05 MIU/L-ACNC: 2.57 UIU/ML (ref 0.45–4.5)
WBC # BLD AUTO: 5.03 THOUSAND/UL (ref 4.31–10.16)

## 2025-02-04 PROCEDURE — G0103 PSA SCREENING: HCPCS

## 2025-02-04 PROCEDURE — 85025 COMPLETE CBC W/AUTO DIFF WBC: CPT

## 2025-02-04 PROCEDURE — 36415 COLL VENOUS BLD VENIPUNCTURE: CPT

## 2025-02-04 PROCEDURE — 80053 COMPREHEN METABOLIC PANEL: CPT

## 2025-02-04 PROCEDURE — 80061 LIPID PANEL: CPT

## 2025-02-04 PROCEDURE — 84443 ASSAY THYROID STIM HORMONE: CPT

## 2025-02-10 ENCOUNTER — OFFICE VISIT (OUTPATIENT)
Dept: FAMILY MEDICINE CLINIC | Facility: CLINIC | Age: 70
End: 2025-02-10
Payer: MEDICARE

## 2025-02-10 VITALS
DIASTOLIC BLOOD PRESSURE: 70 MMHG | WEIGHT: 169 LBS | BODY MASS INDEX: 24.2 KG/M2 | HEART RATE: 67 BPM | OXYGEN SATURATION: 95 % | RESPIRATION RATE: 16 BRPM | SYSTOLIC BLOOD PRESSURE: 110 MMHG | TEMPERATURE: 97.4 F | HEIGHT: 70 IN

## 2025-02-10 DIAGNOSIS — E78.49 OTHER HYPERLIPIDEMIA: ICD-10-CM

## 2025-02-10 DIAGNOSIS — B02.29 POSTHERPETIC NEURALGIA: ICD-10-CM

## 2025-02-10 DIAGNOSIS — I48.20 CHRONIC A-FIB (HCC): ICD-10-CM

## 2025-02-10 PROBLEM — B02.9 SHINGLES: Status: RESOLVED | Noted: 2023-03-25 | Resolved: 2025-02-10

## 2025-02-10 PROBLEM — I10 PRIMARY HYPERTENSION: Status: RESOLVED | Noted: 2023-11-06 | Resolved: 2025-02-10

## 2025-02-10 PROCEDURE — 99214 OFFICE O/P EST MOD 30 MIN: CPT | Performed by: FAMILY MEDICINE

## 2025-02-10 PROCEDURE — G2211 COMPLEX E/M VISIT ADD ON: HCPCS | Performed by: FAMILY MEDICINE

## 2025-02-10 RX ORDER — GABAPENTIN 100 MG/1
100 CAPSULE ORAL 3 TIMES DAILY
Qty: 90 CAPSULE | Refills: 5 | Status: SHIPPED | OUTPATIENT
Start: 2025-02-10

## 2025-02-10 RX ORDER — GABAPENTIN 300 MG/1
300 CAPSULE ORAL
Qty: 90 CAPSULE | Refills: 1 | Status: SHIPPED | OUTPATIENT
Start: 2025-02-10

## 2025-02-10 NOTE — ASSESSMENT & PLAN NOTE
Discussed low-fat diet and regular exercise. Continue to monitor on fasting lipid profile.  Orders:  •  gabapentin (NEURONTIN) 300 mg capsule; Take 1 capsule (300 mg total) by mouth daily at bedtime  •  CBC and differential; Future  •  Comprehensive metabolic panel; Future  •  Lipid Panel with Direct LDL reflex; Future  •  TSH, 3rd generation with Free T4 reflex; Future

## 2025-02-10 NOTE — PROGRESS NOTES
Name: Suraj Hernandez      : 1955      MRN: [id number]  Encounter Provider: Guerda Hutchins MD  Encounter Date: 2/10/2025   Encounter department: [facility]  :  Assessment & Plan  Postherpetic neuralgia  Well controlled on gabapentin. Continue same. Will continue to monitor.  Orders:  •  gabapentin (NEURONTIN) 100 mg capsule; Take 1 capsule (100 mg total) by mouth 3 (three) times a day  •  gabapentin (NEURONTIN) 300 mg capsule; Take 1 capsule (300 mg total) by mouth daily at bedtime    Other hyperlipidemia  Discussed low-fat diet and regular exercise. Continue to monitor on fasting lipid profile.  Orders:  •  gabapentin (NEURONTIN) 300 mg capsule; Take 1 capsule (300 mg total) by mouth daily at bedtime  •  CBC and differential; Future  •  Comprehensive metabolic panel; Future  •  Lipid Panel with Direct LDL reflex; Future  •  TSH, 3rd generation with Free T4 reflex; Future    Chronic a-fib (HCC)  Patient asymptomatic. Currently taking propanolol 20 mg but only as needed. He was previously told to contact cardiology but has not done so. He is not interested in anticoagulation therapy. Explained risks of untreated atrial fibrillation and patient verbalized understanding.              History of Present Illness   Suraj Hernandez is a 69-year-old male with a PMH significant for HLD, chronic a-fib, and postherpetic neuralgia who presents for a 6 month follow-up. The patient was last seen in our office on 2024 and does not report any new complaints since then. He is compliant to his medical regimen and tolerating it well. The patient denies chest pain, shortness of breath, fevers, chills, N/V/D, dizziness, lightheadedness, or syncope.    Hyperlipidemia  Pertinent negatives include no chest pain or shortness of breath.     Review of Systems   Constitutional:  Negative for chills and fever.   HENT:  Negative for ear pain and sore throat.    Eyes:  Negative for pain and visual disturbance.  "  Respiratory:  Negative for cough and shortness of breath.    Cardiovascular:  Negative for chest pain and palpitations.   Gastrointestinal:  Negative for abdominal pain and vomiting.   Genitourinary:  Negative for dysuria and hematuria.   Musculoskeletal:  Negative for arthralgias and back pain.   Skin:  Negative for color change and rash.   Neurological:  Negative for seizures and syncope.   All other systems reviewed and are negative.      Objective   /70 (BP Location: Left arm, Patient Position: Sitting, Cuff Size: Large)   Pulse 67   Temp (!) 97.4 °F (36.3 °C) (Tympanic)   Resp 16   Ht 5' 10\" (1.778 m)   Wt 76.7 kg (169 lb)   SpO2 95%   BMI 24.25 kg/m²      Physical Exam  Vitals and nursing note reviewed.   Constitutional:       General: He is not in acute distress.     Appearance: Normal appearance. He is well-developed.   HENT:      Head: Normocephalic and atraumatic.   Eyes:      Conjunctiva/sclera: Conjunctivae normal.   Cardiovascular:      Rate and Rhythm: Rhythm irregularly irregular.      Heart sounds: No murmur heard.  Pulmonary:      Effort: Pulmonary effort is normal. No respiratory distress.      Breath sounds: Normal breath sounds.   Abdominal:      Palpations: Abdomen is soft.      Tenderness: There is no abdominal tenderness.   Musculoskeletal:         General: No swelling.      Cervical back: Neck supple.   Skin:     General: Skin is warm and dry.      Capillary Refill: Capillary refill takes less than 2 seconds.   Neurological:      Mental Status: He is alert.   Psychiatric:         Mood and Affect: Mood normal.         "

## 2025-02-10 NOTE — ASSESSMENT & PLAN NOTE
Patient asymptomatic. Currently taking propanolol 20 mg but only as needed. He was previously told to contact cardiology but has not done so. He is not interested in anticoagulation therapy. Explained risks of untreated atrial fibrillation and patient verbalized understanding.

## 2025-02-10 NOTE — ASSESSMENT & PLAN NOTE
Well controlled on gabapentin. Continue same. Will continue to monitor.  Orders:  •  gabapentin (NEURONTIN) 100 mg capsule; Take 1 capsule (100 mg total) by mouth 3 (three) times a day  •  gabapentin (NEURONTIN) 300 mg capsule; Take 1 capsule (300 mg total) by mouth daily at bedtime